# Patient Record
Sex: FEMALE | URBAN - METROPOLITAN AREA
[De-identification: names, ages, dates, MRNs, and addresses within clinical notes are randomized per-mention and may not be internally consistent; named-entity substitution may affect disease eponyms.]

---

## 2024-01-04 ENCOUNTER — APPOINTMENT (RX ONLY)
Dept: URBAN - METROPOLITAN AREA CLINIC 157 | Facility: CLINIC | Age: 29
Setting detail: DERMATOLOGY
End: 2024-01-04

## 2024-01-04 VITALS — HEIGHT: 69 IN | WEIGHT: 200 LBS

## 2024-01-04 DIAGNOSIS — L50.9 URTICARIA, UNSPECIFIED: ICD-10-CM

## 2024-01-04 PROCEDURE — ? DIAGNOSIS COMMENT

## 2024-01-04 PROCEDURE — ? PRESCRIPTION

## 2024-01-04 PROCEDURE — 99203 OFFICE O/P NEW LOW 30 MIN: CPT

## 2024-01-04 PROCEDURE — ? TREATMENT REGIMEN

## 2024-01-04 PROCEDURE — ? COUNSELING

## 2024-01-04 RX ORDER — SALICYLIC ACID 3 G/100G
LOTION TOPICAL
Qty: 90 | Refills: 0 | Status: ERX | COMMUNITY
Start: 2024-01-04

## 2024-01-04 RX ADMIN — SALICYLIC ACID: 3 LOTION TOPICAL at 00:00

## 2024-01-04 ASSESSMENT — LOCATION DETAILED DESCRIPTION DERM: LOCATION DETAILED: 4TH WEB SPACE RIGHT HAND

## 2024-01-04 ASSESSMENT — LOCATION ZONE DERM: LOCATION ZONE: HAND

## 2024-01-04 ASSESSMENT — LOCATION SIMPLE DESCRIPTION DERM: LOCATION SIMPLE: RIGHT HAND

## 2024-01-04 NOTE — PROCEDURE: DIAGNOSIS COMMENT
Comment: She is clear on exam today. Education and reassurance. I discussed the need to continue with hydroxyzine that was given by primary care provider and I added Allegra 180 mg every morning as needed. Discussed to count back to the clinic if she is experiencing flare. Will consider biopsy if she comes back.
Detail Level: Simple
Render Risk Assessment In Note?: no

## 2024-01-04 NOTE — PROCEDURE: MIPS QUALITY
Detail Level: Detailed
Quality 110: Preventive Care And Screening: Influenza Immunization: Influenza Immunization previously received during influenza season
Quality 111:Pneumonia Vaccination Status For Older Adults: Patient did not receive any pneumococcal conjugate or polysaccharide vaccine on or after their 60th birthday and before the end of the measurement period
Quality 226: Preventive Care And Screening: Tobacco Use: Screening And Cessation Intervention: Patient screened for tobacco use and is an ex/non-smoker

## 2024-01-04 NOTE — PROCEDURE: TREATMENT REGIMEN
Detail Level: Zone
Initiate Treatment: Allegra Allergy 180 mg tablet \\nQuantity: 90.0 Tablet\\nSig: Take 1 tablet every morning
Continue Regimen: Hydroxyzine